# Patient Record
Sex: FEMALE | Race: WHITE | NOT HISPANIC OR LATINO | URBAN - METROPOLITAN AREA
[De-identification: names, ages, dates, MRNs, and addresses within clinical notes are randomized per-mention and may not be internally consistent; named-entity substitution may affect disease eponyms.]

---

## 2017-05-08 ENCOUNTER — ALLSCRIPTS OFFICE VISIT (OUTPATIENT)
Dept: OTHER | Facility: OTHER | Age: 27
End: 2017-05-08

## 2018-01-16 NOTE — PROGRESS NOTES
Assessment    1  Encounter for preventive health examination (V70 0) (Z00 00)   2  Anemia (285 9) (D64 9)    Discussion/Summary  health maintenance visit Currently, she eats a healthy diet  the risks and benefits of cervical cancer screening were discussed cervical cancer screening is current cervical cancer screening is needed every year cervical cancer screening is managed by [facility] Tamayo Breast cancer screening: the risks and benefits of breast cancer screening were discussed and monthly self breast exam was advised  Colorectal cancer screening: the risks and benefits of colorectal cancer screening were discussed  Osteoporosis screening: the risks and benefits of osteoporosis screening were discussed  The risks and benefits of immunizations were discussed and immunizations are up to date  Advice and education were given regarding nutrition, aerobic exercise, weight bearing exercise, calcium supplements, vitamin D supplements, reproductive health, contraception, sunscreen use, self skin examination, helmet use and seat belt use  #1: Health maintenance: Patient appears to be doing quite well at this point  No particular concerns  She does see GYN for most of her GYN care, and I did discuss with her the importance of doing self breast exam as well  I would recommend general laboratory studies, though she did mention she had quite a few in the last year or so  Based on that, at some point be nice to have copies in our chart for completeness  #2: Anemia: Patient does have history of anemia  This is likely resolved at this point, she is on Renagel vitamins, and is recently had a pregnancy  Would like to recheck a CBC, unless it is been done by GYN in the recent past  If it has, I would request a copy be brought to the office so that we may have a for completeness sake  Possible side effects of new medications were reviewed with the patient/guardian today   The treatment plan was reviewed with the patient/guardian  The patient/guardian understands and agrees with the treatment plan      Chief Complaint  pt here for a physical for work  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  Pt needs a TB test  ak      History of Present Illness  HM, Adult Female: The patient is being seen for a health maintenance evaluation  The last health maintenance visit was 2 year(s) ago  Social History: Household members include spouse and 1 son(s)  She is   Work status: working full time and On leave  Will start full time in June  The patient has never smoked cigarettes  She reports rare alcohol use  The patient has no concerns about alcohol abuse  She has never used illicit drugs  General Health: The patient's health since the last visit is described as good  She has regular dental visits  She complains of vision problems  Vision care includes wearing glasses and wearing soft contact lenses  She denies hearing loss  Immunizations status: up to date  Reproductive health:  she uses contraception (Following with OB/GYN  Is currently off, but plans on starting)  For contraception, she uses oral contraception pills  Screening:      Review of Systems    Constitutional: No fever, no chills, feels well, no tiredness, no recent weight gain or weight loss  Eyes: No complaints of eye pain, no red eyes, no eyesight problems, no discharge, no dry eyes, no itching of eyes  ENT: no complaints of earache, no loss of hearing, no nose bleeds, no nasal discharge, no sore throat, no hoarseness  Cardiovascular: No complaints of slow heart rate, no fast heart rate, no chest pain, no palpitations, no leg claudication, no lower extremity edema  Respiratory: No complaints of shortness of breath, no wheezing, no cough, no SOB on exertion, no orthopnea, no PND  Gastrointestinal: No complaints of abdominal pain, no constipation, no nausea or vomiting, no diarrhea, no bloody stools     Genitourinary: No complaints of dysuria, no incontinence, no pelvic pain, no dysmenorrhea, no vaginal discharge or bleeding  Musculoskeletal: No complaints of arthralgias, no myalgias, no joint swelling or stiffness, no limb pain or swelling  Integumentary: No complaints of skin rash or lesions, no itching, no skin wounds, no breast pain or lump  Neurological: No complaints of headache, no confusion, no convulsions, no numbness, no dizziness or fainting, no tingling, no limb weakness, no difficulty walking  Psychiatric: Not suicidal, no sleep disturbance, no anxiety or depression, no change in personality, no emotional problems  Endocrine: No complaints of proptosis, no hot flashes, no muscle weakness, no deepening of the voice, no feelings of weakness  Hematologic/Lymphatic: No complaints of swollen glands, no swollen glands in the neck, does not bleed easily, does not bruise easily  Active Problems    1  Acne (706 1) (L70 9)   2  Amenorrhea (626 0) (N91 2)   3  Anemia (285 9) (D64 9)   4  Birth Control Method   5  General medical examination (V70 9) (Z00 00)   6  Irregular menstrual cycle (626 4) (N92 6)   7  Irritable bowel syndrome (564 1) (K58 9)   8  Visit for routine gyn exam (V72 31) (Z01 419)    Past Medical History    · History of folliculitis (B31 9) (G53 5)   · No pertinent past medical history    Surgical History    · History of Oral Surgery Tooth Extraction    Family History  Mother    · Family history of Family Health Status Of Mother - Alive  Father    · Family history of Family Health Status Of Father - Alive    Social History    · Birth Control Method   · Never A Smoker   · Never Drank Alcohol    Current Meds   1  Prenatal Multi +DHA 27-0 8-228 MG Oral Capsule; Therapy: [id number] to Recorded   2  Vitamin D3 1000 UNIT Oral Capsule; Therapy: [id number] to Recorded   3  Vitamin D3 5000 UNIT Oral Capsule; Therapy: [id number] to Recorded    Allergies    1   No Known Drug Allergies    Vitals Recorded: [id number] 01:04PM   Heart Rate 76   Systolic 646   Diastolic 74   Weight 903 lb    BMI Calculated 23 89   BSA Calculated 1 63     Physical Exam    Constitutional   General appearance: No acute distress, well appearing and well nourished  Eyes   Conjunctiva and lids: No swelling, erythema or discharge  Pupils and irises: Equal, round, reactive to light  Ophthalmoscopic examination: Normal fundi and optic discs  Ears, Nose, Mouth, and Throat   External inspection of ears and nose: Normal     Otoscopic examination: Tympanic membranes translucent with normal light reflex  Canals patent without erythema  Hearing: Normal     Nasal mucosa, septum, and turbinates: Normal without edema or erythema  Lips, teeth, and gums: Normal, good dentition  Oropharynx: Normal with no erythema, edema, exudate or lesions  Neck   Neck: Supple, symmetric, trachea midline, no masses  Thyroid: Normal, no thyromegaly  Pulmonary   Respiratory effort: No increased work of breathing or signs of respiratory distress  Auscultation of lungs: Clear to auscultation  Cardiovascular   Auscultation of heart: Normal rate and rhythm, normal S1 and S2, no murmurs  Carotid pulses: 2+ bilaterally  Abdominal aorta: Normal     Examination of extremities for edema and/or varicosities: Normal     Abdomen   Abdomen: Non-tender, no masses  Liver and spleen: No hepatomegaly or splenomegaly  Lymphatic   Palpation of lymph nodes in neck: No lymphadenopathy  Palpation of lymph nodes in axillae: No lymphadenopathy  Musculoskeletal   Gait and station: Normal     Digits and nails: Normal without clubbing or cyanosis  Joints, bones, and muscles: Normal     Range of motion: Normal     Stability: Normal     Muscle strength/tone: Normal     Skin   Skin and subcutaneous tissue: Normal without rashes or lesions  Palpation of skin and subcutaneous tissue: Normal turgor      Neurologic   Cranial nerves: Cranial nerves II-XII intact  Reflexes: 2+ and symmetric  Sensation: No sensory loss  Psychiatric   Judgment and insight: Normal     Orientation to person, place, and time: Normal     Recent and remote memory: Intact  Mood and affect: Normal        Signatures   Electronically signed by :  AUSTEN Sams ; May  8 2017  1:29PM EST                       (Author)

## 2018-01-22 VITALS
SYSTOLIC BLOOD PRESSURE: 112 MMHG | WEIGHT: 134 LBS | BODY MASS INDEX: 23.89 KG/M2 | HEART RATE: 76 BPM | DIASTOLIC BLOOD PRESSURE: 74 MMHG